# Patient Record
Sex: MALE | Race: WHITE | Employment: FULL TIME | URBAN - METROPOLITAN AREA
[De-identification: names, ages, dates, MRNs, and addresses within clinical notes are randomized per-mention and may not be internally consistent; named-entity substitution may affect disease eponyms.]

---
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
## 2022-03-22 ENCOUNTER — NURSE TRIAGE (OUTPATIENT)
Dept: FAMILY MEDICINE | Facility: CLINIC | Age: 22
End: 2022-03-22
Payer: COMMERCIAL

## 2022-03-22 NOTE — TELEPHONE ENCOUNTER
"Patient scheduled a video visit for 3/25/2022 for \"Smokey smell and taste. Chest pain with certain movements.\"     Please triage  "

## 2022-03-23 NOTE — TELEPHONE ENCOUNTER
RN TRIAGE CALL:    Patient Contact    Attempt # 2    Was call answered?  No.  Left message on voicemail with information to call clinic triage nurse back, just wanting to ask some questions regarding your upcoming scheduled visit.    Gisselle Loja RN

## 2022-03-25 ENCOUNTER — VIRTUAL VISIT (OUTPATIENT)
Dept: FAMILY MEDICINE | Facility: CLINIC | Age: 22
End: 2022-03-25
Payer: COMMERCIAL

## 2022-03-25 DIAGNOSIS — M94.0 COSTOCHONDRITIS: Primary | ICD-10-CM

## 2022-03-25 DIAGNOSIS — R43.2 ALTERED TASTE: ICD-10-CM

## 2022-03-25 PROCEDURE — 99214 OFFICE O/P EST MOD 30 MIN: CPT | Mod: TEL | Performed by: FAMILY MEDICINE

## 2022-03-25 RX ORDER — NAPROXEN 500 MG/1
500 TABLET ORAL 2 TIMES DAILY PRN
Qty: 60 TABLET | Refills: 0 | Status: SHIPPED | OUTPATIENT
Start: 2022-03-25

## 2022-03-25 RX ORDER — NAPROXEN 500 MG/1
500 TABLET ORAL 2 TIMES DAILY PRN
Qty: 60 TABLET | Refills: 0 | Status: SHIPPED | OUTPATIENT
Start: 2022-03-25 | End: 2022-03-25

## 2022-03-25 RX ORDER — PREDNISONE 20 MG/1
20 TABLET ORAL DAILY
Qty: 7 TABLET | Refills: 0 | Status: SHIPPED | OUTPATIENT
Start: 2022-03-25 | End: 2022-03-25

## 2022-03-25 RX ORDER — PREDNISONE 20 MG/1
20 TABLET ORAL DAILY
Qty: 7 TABLET | Refills: 0 | Status: SHIPPED | OUTPATIENT
Start: 2022-03-25

## 2022-03-25 NOTE — TELEPHONE ENCOUNTER
Patient states he does not recall when this chest pain started.  He has some pain in the center of his chest that only hurts when he stretches a certain way or coughs.  He does not remember any injury.    He is reporting the smokey smell and taste is fading, so this is no longer a concern to him.      He has no other symptoms.  He has not tried any home care ideas.  He is informed that there is not a whole lot we can do over a video visit for him as the provider will not be able to listen to his lungs, or physically check him over.  He would still like to keep his appointment    Reason for Disposition    Unexplained chest pain (Exception: explained pain due to coughing, heartburn or sore muscles)    Additional Information    Negative: Severe difficulty breathing (struggling for each breath, grunting to push air out, unable to speak or cry, severe reactions)    Negative: Lips or face are bluish now    Negative: Sounds like a life-threatening emergency to the triager    Negative: Follows an injury to the chest    Negative: Previously diagnosed asthma and has asthma symptoms now    Negative: SEVERE (excruciating) chest pain    Negative: Has known heart disease    Negative: Using birth control method (BCPs, patch, ring) that contains estrogen and new onset of chest pain or shortness of breath    Negative: Pulmonary embolus risk factors (e.g., recent leg fracture or surgery, central line, prolonged bedrest or immobility)    Negative: Child sounds very sick or weak to the triager    Negative: Difficulty breathing    Negative: Can't take a deep breath because of chest pain    Negative: Fainted    Negative: Lips or face turned bluish for a brief period    Negative: Heart beating very rapidly for > 1 hour    Negative: Fever    Protocols used: CHEST PAIN-P-OH

## 2022-03-25 NOTE — PROGRESS NOTES
Dre is a 22 year old who is being evaluated via a billable video visit.      How would you like to obtain your AVS? MyChart  If the video visit is dropped, the invitation should be resent by: Text to cell phone: [phone]  Will anyone else be joining your video visit? No      Video Start Time: Switched to telephone visit.    Assessment & Plan     (M94.0) Costochondritis  (primary encounter diagnosis)  Comment: Informed him that based on his symptoms, his pain is likely due to skeletal muscle in nature, more consistent with costochondritis.  Discussed with him about the nature of the condition.  He did not sound acutely sick over the phone.  No risk for cardiac or PE identified.  No infectious symptoms.  Encouraged to take deep breaths frequently.  Will have him try the prednisone for inflammation and  Naproxen as needed for pain as well.  Normal activities as tolerated.  Call in or follow-up in person if not improve or gets worse.  ER if develop associated shortness of breath, dyspnea, orthopnea, diaphoresis, or if has any concern.    Unlikely the chest x-ray will give much more information based on his symptoms.  Chest x-ray was offered however.  He wanted to hold off for now.  Will get the chest x-ray if the symptoms persist or not improved with above treatment.    Plan: predniSONE (DELTASONE) 20 MG tablet, naproxen         (NAPROSYN) 500 MG tablet, DISCONTINUED:         predniSONE (DELTASONE) 20 MG tablet,         DISCONTINUED: naproxen (NAPROSYN) 500 MG         tablet, DISCONTINUED: naproxen (NAPROSYN) 500         MG tablet, DISCONTINUED: predniSONE (DELTASONE)        20 MG tablet            (R43.2) Altered taste  Comment: Stated that he has it at least annually, no association with the season -typically lasted about a week.  No symptoms of uncontrolled acid reflux or upper respiratory infection.  No history or symptoms of seizure.  Unable to perform of physical exam due to virtual visit.  Overall, it is  "getting better and therefore recommended to monitor for now.  Instructed follow-up if gets worse or persists.  She felt comfortable with the plan        18 minutes spent on the date of the encounter doing chart review, history and exam, documentation and further activities per the note      Return in about 3 months (around 6/25/2022) for Physical Exam.    Salbador Crockett Mai, MD  [facility]    Kristyn Erickson is a 22 year old who presents for the following health issues     History of Present Illness       Reason for visit:  Smoky taste and smell, chest pain when sneeze, cough or certain movements  Symptom onset:  3-7 days ago  Symptoms include:  Smoky taste and smell  Symptom intensity:  Mild  Symptom progression:  Improving  Had these symptoms before:  Yes  Has tried/received treatment for these symptoms:  No  What makes it worse:  No  What makes it better:  No    He eats 0-1 servings of fruits and vegetables daily.He consumes 1 sweetened beverage(s) daily.He exercises with enough effort to increase his heart rate 9 or less minutes per day.  He exercises with enough effort to increase his heart rate 3 or less days per week.   He is taking medications regularly.     Dre was seen today for couple concerns.  First is a week history of \"smoky tasting and smelling\".  Stated he has it probably at least once a year with no known cause or associated season.  No URI symptoms including runny nose, nasal congestion, sinus pain/pressure, postnasal drainage or coughing. No unusual food; no alcohol, drugs or tobacco use.  No acid reflux symptoms.  No personal or family history of seizure disorder.  No headache or dizziness.  Not taking any medication chronically.  In the past, it usually went away on his own, typically last about a week.  It is overall getting better.    Also been having the chest pain for about 2 months and it is overall about the same.  It is mainly on the right side and is triggered " and worsen with coughing, sneezing, stretching or pushing.  Achy pressure pain which is relieved with resting.  No associated shortness of breath, diaphoresis or dyspnea.  No unusual activities or recent history of trauma.  No coughing, runny nose or nasal congestion.  No heart palpitation.  No personal or family history of heart disease.  No smoking or recent history of long distance traveling.  No acid reflux symptoms.  No other concern.    Review of Systems   Constitutional, HEENT, cardiovascular, pulmonary, gi and gu systems are negative, except as otherwise noted.      Objective           Vitals:  No vitals were obtained today due to virtual visit.    Physical Exam   GENERAL: Healthy, alert and no distress.  Speaking in full sentences.    RESP: No audible wheeze, cough, or increased work of breathing.    PSYCH: Mentation appears normal, affect normal/bright, judgement and insight intact, or normal speech.       Video-Visit Details    Type of service:  Telephone   Total time: 13 minutes   Started time: 3 PM.   Ended time: 3:13 PM

## 2022-06-25 ENCOUNTER — HEALTH MAINTENANCE LETTER (OUTPATIENT)
Age: 22
End: 2022-06-25

## 2022-11-16 ENCOUNTER — IMMUNIZATION (OUTPATIENT)
Dept: FAMILY MEDICINE | Facility: CLINIC | Age: 22
End: 2022-11-16
Payer: COMMERCIAL

## 2022-11-16 PROCEDURE — 90471 IMMUNIZATION ADMIN: CPT

## 2022-11-16 PROCEDURE — 90686 IIV4 VACC NO PRSV 0.5 ML IM: CPT

## 2022-11-16 PROCEDURE — 91312 COVID-19,PF,PFIZER BOOSTER BIVALENT: CPT

## 2022-11-16 PROCEDURE — 0124A COVID-19,PF,PFIZER BOOSTER BIVALENT: CPT

## 2023-07-02 ENCOUNTER — HEALTH MAINTENANCE LETTER (OUTPATIENT)
Age: 23
End: 2023-07-02

## 2024-08-25 ENCOUNTER — HEALTH MAINTENANCE LETTER (OUTPATIENT)
Age: 24
End: 2024-08-25